# Patient Record
Sex: MALE
[De-identification: names, ages, dates, MRNs, and addresses within clinical notes are randomized per-mention and may not be internally consistent; named-entity substitution may affect disease eponyms.]

---

## 2020-12-18 PROBLEM — Z00.00 ENCOUNTER FOR PREVENTIVE HEALTH EXAMINATION: Status: ACTIVE | Noted: 2020-12-18

## 2020-12-21 ENCOUNTER — APPOINTMENT (OUTPATIENT)
Dept: OTOLARYNGOLOGY | Facility: CLINIC | Age: 45
End: 2020-12-21
Payer: MEDICAID

## 2020-12-21 ENCOUNTER — OUTPATIENT (OUTPATIENT)
Dept: OUTPATIENT SERVICES | Facility: HOSPITAL | Age: 45
LOS: 1 days | Discharge: HOME | End: 2020-12-21

## 2020-12-21 VITALS
SYSTOLIC BLOOD PRESSURE: 145 MMHG | HEIGHT: 69 IN | BODY MASS INDEX: 28.88 KG/M2 | HEART RATE: 86 BPM | DIASTOLIC BLOOD PRESSURE: 86 MMHG | WEIGHT: 195 LBS

## 2020-12-21 DIAGNOSIS — R09.82 POSTNASAL DRIP: ICD-10-CM

## 2020-12-21 DIAGNOSIS — Z78.9 OTHER SPECIFIED HEALTH STATUS: ICD-10-CM

## 2020-12-21 DIAGNOSIS — Z86.39 PERSONAL HISTORY OF OTHER ENDOCRINE, NUTRITIONAL AND METABOLIC DISEASE: ICD-10-CM

## 2020-12-21 PROCEDURE — 99204 OFFICE O/P NEW MOD 45 MIN: CPT | Mod: 25

## 2020-12-21 PROCEDURE — 92557 COMPREHENSIVE HEARING TEST: CPT

## 2020-12-21 PROCEDURE — 92567 TYMPANOMETRY: CPT

## 2020-12-21 RX ORDER — LORATADINE 10 MG/1
10 TABLET ORAL DAILY
Qty: 60 | Refills: 1 | Status: ACTIVE | COMMUNITY
Start: 2020-12-21 | End: 1900-01-01

## 2020-12-21 RX ORDER — SIMVASTATIN 80 MG/1
TABLET, FILM COATED ORAL
Refills: 0 | Status: ACTIVE | COMMUNITY

## 2020-12-21 RX ORDER — METHIMAZOLE 5 MG/1
TABLET ORAL
Refills: 0 | Status: ACTIVE | COMMUNITY

## 2020-12-21 NOTE — REASON FOR VISIT
[Initial Consultation] : an initial consultation for [Other: _____] : [unfilled] [FreeTextEntry2] : referred by Dr Johnathan Pacheco, PCP for right clogged ear

## 2020-12-21 NOTE — HISTORY OF PRESENT ILLNESS
[de-identified] : 45 year old male referred by Dr Johnathan Pacheco, PCP for right clogged ear for the past few weeks. Patient thinks these may be due to allergies because he has a long history of clogging of the R ear in the fall months. He had a R ear tube placed by a different ENT last year, but it extruded in August. \par Earlier this month, the patient went to a different ENT who prescribed a medrol dosepak for a R sided effusion, which helped and cleared the clogged sensation, but then it returned. The outside ENT told him to go to a different ENT to evaluate if he needed an ear tube. He had an audiogram at that time which showed decreased hearing in the R ear. That ENT also perform flexible laryngoscopy without significant findings. Patient does not have a copy of his audio or records. He also reports globus sensation and b/l nasal congestion. Has taken antibiotics in the past for sinusitis. He has been taking claritin for 10 days at a time which helps his symptoms. He is using flonase 2 sprays in each nostril every morning for the last 2 weeks. no loss of smell or taste, fevers, or otalgia. Reports he has bad reflux. \par \par Patient also snoring for many years. He had a home sleep study which did not show ANNE but patient says it was difficult to perform at home. was told by an outside ENT that his snoring was due to his tongue. no daytime sleepiness, does not wake up at night. His wife reports he sometimes stops breathing at night for short periods of time. wakes up with a dry mouth.

## 2021-01-09 ENCOUNTER — APPOINTMENT (OUTPATIENT)
Dept: OTOLARYNGOLOGY | Facility: CLINIC | Age: 46
End: 2021-01-09

## 2021-01-15 DIAGNOSIS — H69.83 OTHER SPECIFIED DISORDERS OF EUSTACHIAN TUBE, BILATERAL: ICD-10-CM

## 2021-01-15 DIAGNOSIS — R09.82 POSTNASAL DRIP: ICD-10-CM

## 2021-03-22 ENCOUNTER — APPOINTMENT (OUTPATIENT)
Dept: OTOLARYNGOLOGY | Facility: CLINIC | Age: 46
End: 2021-03-22
Payer: MEDICAID

## 2021-03-22 VITALS
BODY MASS INDEX: 28.88 KG/M2 | HEIGHT: 69 IN | SYSTOLIC BLOOD PRESSURE: 122 MMHG | WEIGHT: 195 LBS | DIASTOLIC BLOOD PRESSURE: 78 MMHG | HEART RATE: 81 BPM

## 2021-03-22 DIAGNOSIS — H69.83 OTHER SPECIFIED DISORDERS OF EUSTACHIAN TUBE, BILATERAL: ICD-10-CM

## 2021-03-22 DIAGNOSIS — R06.83 SNORING: ICD-10-CM

## 2021-03-22 PROCEDURE — 99072 ADDL SUPL MATRL&STAF TM PHE: CPT

## 2021-03-22 PROCEDURE — 92557 COMPREHENSIVE HEARING TEST: CPT

## 2021-03-22 PROCEDURE — 99214 OFFICE O/P EST MOD 30 MIN: CPT | Mod: 25

## 2021-03-22 PROCEDURE — 92567 TYMPANOMETRY: CPT

## 2021-03-22 RX ORDER — PSEUDOEPHEDRINE HCL 30 MG
30 TABLET ORAL EVERY 6 HOURS
Qty: 30 | Refills: 0 | Status: ACTIVE | COMMUNITY
Start: 2020-12-21 | End: 1900-01-01

## 2021-03-22 RX ORDER — FLUTICASONE PROPIONATE 50 UG/1
50 SPRAY, METERED NASAL DAILY
Qty: 1 | Refills: 2 | Status: ACTIVE | COMMUNITY
Start: 2021-03-22 | End: 1900-01-01

## 2021-03-22 NOTE — PHYSICAL EXAM
[Nasal Endoscopy Performed] : nasal endoscopy was performed, see procedure section for findings [Normal] :  tongue is normal [Removed] : palatine tonsils previously removed [de-identified] : Right sided small amount of scant fluid and evidence of retraction as well

## 2021-03-22 NOTE — HISTORY OF PRESENT ILLNESS
[de-identified] : 45 year old male follow up for mild CHL, history of ETD, snoring, post nasal drip and reflux, recommended to continue with Flonase and Claritin, hydration encouraged, prescribed Sudafed. Pt states that he still feeling intermittent right ear congestion/clogged. This occurs for hours and then gets better. He is also complaining of nasal congestion in the morning. This gets better after taking loratadine.Was seen by an allergist in the past and was told he had an allergy to dust. Was using flonase in the past but stopped. Not using nasal saline sprays. \par \par Pt also reports snoring and episodes of apnea at night as per his wife. States he has had home sleep study in the past but not sure of results. Has hx of tonsillectomy in the past when he was 4 years old.

## 2023-01-18 NOTE — PHYSICAL EXAM
[Midline] : trachea located in midline position [Normal] : no rashes [de-identified] : R side retracted, no effusion no